# Patient Record
Sex: MALE | Race: WHITE | NOT HISPANIC OR LATINO | URBAN - METROPOLITAN AREA
[De-identification: names, ages, dates, MRNs, and addresses within clinical notes are randomized per-mention and may not be internally consistent; named-entity substitution may affect disease eponyms.]

---

## 2019-04-23 ENCOUNTER — APPOINTMENT (RX ONLY)
Dept: URBAN - METROPOLITAN AREA CLINIC 38 | Facility: CLINIC | Age: 69
Setting detail: DERMATOLOGY
End: 2019-04-23

## 2019-04-23 ENCOUNTER — APPOINTMENT (RX ONLY)
Dept: URBAN - METROPOLITAN AREA CLINIC 37 | Facility: CLINIC | Age: 69
Setting detail: DERMATOLOGY
End: 2019-04-23

## 2019-04-23 DIAGNOSIS — L27.0 GENERALIZED SKIN ERUPTION DUE TO DRUGS AND MEDICAMENTS TAKEN INTERNALLY: ICD-10-CM

## 2019-04-23 PROBLEM — Z85.828 PERSONAL HISTORY OF OTHER MALIGNANT NEOPLASM OF SKIN: Status: ACTIVE | Noted: 2019-04-23

## 2019-04-23 PROBLEM — I10 ESSENTIAL (PRIMARY) HYPERTENSION: Status: ACTIVE | Noted: 2019-04-23

## 2019-04-23 PROCEDURE — ? COUNSELING

## 2019-04-23 PROCEDURE — ? DEFER

## 2019-04-23 PROCEDURE — 99203 OFFICE O/P NEW LOW 30 MIN: CPT

## 2019-04-23 PROCEDURE — ? PRESCRIPTION

## 2019-04-23 PROCEDURE — ? TREATMENT REGIMEN

## 2019-04-23 RX ORDER — BETAMETHASONE DIPROPIONATE 0.5 MG/G
THIN LAYER CREAM TOPICAL BID
Qty: 1 | Refills: 0 | Status: ERX | COMMUNITY
Start: 2019-04-23

## 2019-04-23 RX ORDER — TRIAMCINOLONE ACETONIDE 1 MG/G
THIN LAYER CREAM TOPICAL TWICE DAILY
Qty: 1 | Refills: 0 | Status: ERX

## 2019-04-23 RX ORDER — TRIAMCINOLONE ACETONIDE 1 MG/G
THIN LAYER OINTMENT TOPICAL BID
Qty: 1 | Refills: 0 | Status: ERX | COMMUNITY
Start: 2019-04-23

## 2019-04-23 RX ADMIN — BETAMETHASONE DIPROPIONATE THIN LAYER: 0.5 CREAM TOPICAL at 14:10

## 2019-04-23 RX ADMIN — TRIAMCINOLONE ACETONIDE THIN LAYER: 1 OINTMENT TOPICAL at 14:35

## 2019-04-23 ASSESSMENT — LOCATION DETAILED DESCRIPTION DERM
LOCATION DETAILED: RIGHT SUPERIOR MEDIAL UPPER BACK
LOCATION DETAILED: RIGHT SUPERIOR MEDIAL UPPER BACK
LOCATION DETAILED: STERNUM
LOCATION DETAILED: STERNUM

## 2019-04-23 ASSESSMENT — LOCATION ZONE DERM
LOCATION ZONE: TRUNK
LOCATION ZONE: TRUNK

## 2019-04-23 ASSESSMENT — LOCATION SIMPLE DESCRIPTION DERM
LOCATION SIMPLE: RIGHT UPPER BACK
LOCATION SIMPLE: RIGHT UPPER BACK
LOCATION SIMPLE: CHEST
LOCATION SIMPLE: CHEST

## 2019-04-23 NOTE — HPI: RASH
What Type Of Note Output Would You Prefer (Optional)?: Bullet Format
How Severe Is Your Rash?: severe
Is This A New Presentation, Or A Follow-Up?: Rash
Additional History: Pt presents today for a rash, recently was in the hospital for CHF. Pt had a catch procedure done. Started new medications: Lasix and metoprolol. The rash is extremely itchy and red.\\n\\nWhen he had the cath with dye he states he doesn't believe the rash started immediately after but in the next day or so after but he also started Lasix and metoprolol at the same time. \\n\\nHe was previously on Lisinopril but had been on that for \"20 years\" and was never with any side effect. He did stop it a few days ago.  \\n\\nHe is being taken off the metoprolol in a few days and the lasix only PRN but will be starting a new medication on Thursday. \\n\\nPatients wife would prefer NOT to have a biopsy due to a history of \"MRSA\". \\n\\nThey saw Cardio yesterday (NP at Dr. Sinha's office). They state he is still having issues with \"fluid retention\" though the lasix is being given PRN.\\n\\nHe has no relief with Gold bond and only temporary relief with Benadryl. He is uncomfortable and cannot sleep

## 2019-04-23 NOTE — PROCEDURE: MIPS QUALITY
Quality 226: Preventive Care And Screening: Tobacco Use: Screening And Cessation Intervention: Patient screened for tobacco use and is an ex/non-smoker
Quality 431: Preventive Care And Screening: Unhealthy Alcohol Use - Screening: Patient screened for unhealthy alcohol use using a single question and scores less than 2 times per year
Quality 130: Documentation Of Current Medications In The Medical Record: Current Medications Documented
Detail Level: Generalized
Quality 110: Preventive Care And Screening: Influenza Immunization: Influenza Immunization not Administered for Documented Reasons.
Quality 111:Pneumonia Vaccination Status For Older Adults: Pneumococcal Vaccination not Administered or Previously Received, Reason not Otherwise Specified
Additional Notes: Pt has not received flu vaccination nor pneumonia vaccination for personal reasons. No ACD
Quality 47: Advance Care Plan: Advance Care Planning discussed and documented in the medical record; patient did not wish or was not able to name a surrogate decision maker or provide an advance care plan.

## 2019-04-23 NOTE — PROCEDURE: TREATMENT REGIMEN
Plan: D/w patient and his wife is likely a medication induced dermatitis (Lasix being the most likely culprit. Given he will be discontinuing the metoprolol and Lasix we should see steady but slow improvement over the next few weeks . \\n\\nExplained treatment would consist of high dose of antihistamines and PREDNISONE, but first have to d/w cardiologist first. I did phone to Dr. Horton in the office but no one was able to s/w me so I left a message asking to be called back asap.  Intent iwll be to stay off the PO prednisone but will have a plan (if approved to do so) should it be absolutley necessary. \\n\\nrecently d/c LISINOPRIL and LASIX is prn at this time.\\n\\nExplained to pt that with last testing of staph, was not MRSA but penicillin resistant staph.\\n\\nWill hold off on PREDNISONE at this time until d/w cardio and again - only use if absolutely necessary. \\n\\nRTC in 1 week unless clear.\\n\\nMed review is as follows: \\n\\nmetoprolol: exanthems 1%, pruritus 1-5%, rash <5%, urticaria\\nlasix (furosemide): exanthems 0.2-12%, pruritus <1%, rash <1%, urticaria, vasculitis\\n\\n**VIVEK**\\n\\n[Sent TCM CREAM by accident, called pharmacy to void and sent new rx for ointment instead.]
Initiate Treatment: BETAMETHASONE AUGMENTED bid for 1 week. Then apply, TCM ointment bid. Avoid face/folds
Otc Regimen: CLARITIN/ZYRTEC/ALLEGRA without decongestant BID. May increase to TID if itch persist
Detail Level: Zone

## 2019-04-23 NOTE — PROCEDURE: TREATMENT REGIMEN
Initiate Treatment: BETAMETHASONE AUGMENTED bid for 1 week. Then apply, TCM ointment bid. Avoid face/folds
Otc Regimen: CLARITIN/ZYRTEC/ALLEGRA without decongestant BID. May increase to TID if itch persist
Plan: D/w patient and his wife is likely a medication induced dermatitis (Lasix being the most likely culprit. Given he will be discontinuing the metoprolol and Lasix we should see steady but slow improvement over the next few weeks . \\n\\nExplained treatment would consist of high dose of antihistamines and PREDNISONE, but first have to d/w cardiologist first. I did phone to Dr. Sinha in the office but no one was able to s/w me so I left a message asking to be called back asap.  Intent iwll be to stay off the PO prednisone but will have a plan (if approved to do so) should it be absolutley necessary. \\n\\nrecently d/c LISINOPRIL and LASIX is prn at this time.\\n\\nExplained to pt that with last testing of staph, was not MRSA but penicillin resistant staph.\\n\\nWill hold off on PREDNISONE at this time until d/w cardio and again - only use if absolutely necessary. \\n\\nRTC in 1 week unless clear.\\n\\nMed review is as follows: \\n\\nmetoprolol: exanthems 1%, pruritus 1-5%, rash <5%, urticaria\\nlasix (furosemide): exanthems 0.2-12%, pruritus <1%, rash <1%, urticaria, vasculitis\\n\\n**HAILY**\\n\\n[Sent TCM CREAM by accident, called pharmacy to void and sent new rx for ointment instead.]
Detail Level: Zone

## 2019-04-23 NOTE — PROCEDURE: DEFER
Introduction Text (Please End With A Colon): The following procedure was deferred:
Instructions (Optional): Patient and wife defer a biopsy due to history of \"MRSA\" which I clarified with patient and wife was scant growth of staph (non-MRSA) on culture s/p left shoulder NMSC excision.  We discussed that with all his current medical issues in play we will hold on a biopsy however if rash does not improve or worsens we will pursue and treat with bactroban post procedure.
Procedure To Be Performed At Next Visit: Biopsy by punch method
Detail Level: Detailed

## 2019-04-23 NOTE — PROCEDURE: DEFER
Procedure To Be Performed At Next Visit: Biopsy by punch method
Introduction Text (Please End With A Colon): The following procedure was deferred:
Detail Level: Detailed
Instructions (Optional): Patient and wife defer a biopsy due to history of \"MRSA\" which I clarified with patient and wife was scant growth of staph (non-MRSA) on culture s/p left shoulder NMSC excision.  We discussed that with all his current medical issues in play we will hold on a biopsy however if rash does not improve or worsens we will pursue and treat with bactroban post procedure.

## 2019-04-23 NOTE — PROCEDURE: MIPS QUALITY
Quality 226: Preventive Care And Screening: Tobacco Use: Screening And Cessation Intervention: Patient screened for tobacco use and is an ex/non-smoker
Quality 111:Pneumonia Vaccination Status For Older Adults: Pneumococcal Vaccination not Administered or Previously Received, Reason not Otherwise Specified
Detail Level: Generalized
Quality 47: Advance Care Plan: Advance Care Planning discussed and documented in the medical record; patient did not wish or was not able to name a surrogate decision maker or provide an advance care plan.
Quality 130: Documentation Of Current Medications In The Medical Record: Current Medications Documented
Quality 431: Preventive Care And Screening: Unhealthy Alcohol Use - Screening: Patient screened for unhealthy alcohol use using a single question and scores less than 2 times per year
Quality 110: Preventive Care And Screening: Influenza Immunization: Influenza Immunization not Administered for Documented Reasons.
Additional Notes: Pt has not received flu vaccination nor pneumonia vaccination for personal reasons. No ACD

## 2019-04-23 NOTE — HPI: RASH
What Type Of Note Output Would You Prefer (Optional)?: Bullet Format
How Severe Is Your Rash?: severe
Is This A New Presentation, Or A Follow-Up?: Rash
Additional History: Pt presents today for a rash, recently was in the hospital for CHF. Pt had a catch procedure done. Started new medications: Lasix and metoprolol. The rash is extremely itchy and red.\\n\\nWhen he had the cath with dye he states he doesn't believe the rash started immediately after but in the next day or so after but he also started Lasix and metoprolol at the same time. \\n\\nHe was previously on Lisinopril but had been on that for \"20 years\" and was never with any side effect. He did stop it a few days ago.  \\n\\nHe is being taken off the metoprolol in a few days and the lasix only PRN but will be starting a new medication on Thursday. \\n\\nPatients wife would prefer NOT to have a biopsy due to a history of \"MRSA\". \\n\\nThey saw Cardio yesterday (NP at Dr. Horton's office). They state he is still having issues with \"fluid retention\" though the lasix is being given PRN.\\n\\nHe has no relief with Gold bond and only temporary relief with Benadryl. He is uncomfortable and cannot sleep

## 2024-05-01 ENCOUNTER — APPOINTMENT (RX ONLY)
Dept: URBAN - METROPOLITAN AREA CLINIC 164 | Facility: CLINIC | Age: 74
Setting detail: DERMATOLOGY
End: 2024-05-01

## 2024-05-01 DIAGNOSIS — L81.4 OTHER MELANIN HYPERPIGMENTATION: ICD-10-CM

## 2024-05-01 DIAGNOSIS — L82.0 INFLAMED SEBORRHEIC KERATOSIS: ICD-10-CM

## 2024-05-01 DIAGNOSIS — L57.0 ACTINIC KERATOSIS: ICD-10-CM

## 2024-05-01 DIAGNOSIS — L57.8 OTHER SKIN CHANGES DUE TO CHRONIC EXPOSURE TO NONIONIZING RADIATION: ICD-10-CM

## 2024-05-01 PROCEDURE — ? COUNSELING

## 2024-05-01 PROCEDURE — ? LIQUID NITROGEN

## 2024-05-01 PROCEDURE — 17000 DESTRUCT PREMALG LESION: CPT | Mod: 59

## 2024-05-01 PROCEDURE — 99213 OFFICE O/P EST LOW 20 MIN: CPT | Mod: 25

## 2024-05-01 PROCEDURE — 17110 DESTRUCTION B9 LES UP TO 14: CPT

## 2024-05-01 ASSESSMENT — LOCATION SIMPLE DESCRIPTION DERM
LOCATION SIMPLE: LEFT CHEEK
LOCATION SIMPLE: RIGHT NOSE
LOCATION SIMPLE: LOWER BACK
LOCATION SIMPLE: RIGHT CHEEK

## 2024-05-01 ASSESSMENT — LOCATION DETAILED DESCRIPTION DERM
LOCATION DETAILED: RIGHT INFERIOR CENTRAL MALAR CHEEK
LOCATION DETAILED: SUPERIOR LUMBAR SPINE
LOCATION DETAILED: LEFT CENTRAL MALAR CHEEK
LOCATION DETAILED: RIGHT NASAL SIDEWALL

## 2024-05-01 ASSESSMENT — LOCATION ZONE DERM
LOCATION ZONE: TRUNK
LOCATION ZONE: NOSE
LOCATION ZONE: FACE

## 2024-05-01 NOTE — PROCEDURE: LIQUID NITROGEN
Consent: The patient's consent was obtained including but not limited to risks of crusting, scabbing, blistering, scarring, darker or lighter pigmentary change, recurrence, incomplete removal and infection.
Show Aperture Variable?: Yes
Number Of Freeze-Thaw Cycles: 1 freeze-thaw cycle
Render Post-Care Instructions In Note?: no
Detail Level: Detailed
Duration Of Freeze Thaw-Cycle (Seconds): 5
Post-Care Instructions: I reviewed with the patient in detail post-care instructions. Patient is to wear sunprotection, and avoid picking at any of the treated lesions. Pt may apply Vaseline to crusted or scabbing areas.
Medical Necessity Information: It is in your best interest to select a reason for this procedure from the list below. All of these items fulfill various CMS LCD requirements except the new and changing color options.
Spray Paint Text: The liquid nitrogen was applied to the skin utilizing a spray paint frosting technique.
Duration Of Freeze Thaw-Cycle (Seconds): 5-10
Medical Necessity Clause: This procedure was medically necessary because the lesions that were treated were:

## 2025-04-02 ENCOUNTER — APPOINTMENT (OUTPATIENT)
Dept: URBAN - METROPOLITAN AREA CLINIC 164 | Facility: CLINIC | Age: 75
Setting detail: DERMATOLOGY
End: 2025-04-02

## 2025-04-02 DIAGNOSIS — L81.4 OTHER MELANIN HYPERPIGMENTATION: ICD-10-CM

## 2025-04-02 DIAGNOSIS — D18.0 HEMANGIOMA: ICD-10-CM

## 2025-04-02 DIAGNOSIS — D22 MELANOCYTIC NEVI: ICD-10-CM

## 2025-04-02 DIAGNOSIS — L57.8 OTHER SKIN CHANGES DUE TO CHRONIC EXPOSURE TO NONIONIZING RADIATION: ICD-10-CM

## 2025-04-02 DIAGNOSIS — L82.1 OTHER SEBORRHEIC KERATOSIS: ICD-10-CM

## 2025-04-02 DIAGNOSIS — Z71.89 OTHER SPECIFIED COUNSELING: ICD-10-CM

## 2025-04-02 PROBLEM — D22.61 MELANOCYTIC NEVI OF RIGHT UPPER LIMB, INCLUDING SHOULDER: Status: ACTIVE | Noted: 2025-04-02

## 2025-04-02 PROBLEM — D22.62 MELANOCYTIC NEVI OF LEFT UPPER LIMB, INCLUDING SHOULDER: Status: ACTIVE | Noted: 2025-04-02

## 2025-04-02 PROBLEM — D22.39 MELANOCYTIC NEVI OF OTHER PARTS OF FACE: Status: ACTIVE | Noted: 2025-04-02

## 2025-04-02 PROBLEM — D22.5 MELANOCYTIC NEVI OF TRUNK: Status: ACTIVE | Noted: 2025-04-02

## 2025-04-02 PROBLEM — D18.01 HEMANGIOMA OF SKIN AND SUBCUTANEOUS TISSUE: Status: ACTIVE | Noted: 2025-04-02

## 2025-04-02 PROCEDURE — ? COUNSELING

## 2025-04-02 PROCEDURE — 99213 OFFICE O/P EST LOW 20 MIN: CPT

## 2025-04-02 ASSESSMENT — LOCATION SIMPLE DESCRIPTION DERM
LOCATION SIMPLE: LEFT FOREARM
LOCATION SIMPLE: LEFT ELBOW
LOCATION SIMPLE: INFERIOR FOREHEAD
LOCATION SIMPLE: LEFT UPPER ARM
LOCATION SIMPLE: LEFT CHEEK
LOCATION SIMPLE: LEFT LOWER BACK
LOCATION SIMPLE: RIGHT POSTERIOR UPPER ARM
LOCATION SIMPLE: LEFT UPPER BACK
LOCATION SIMPLE: RIGHT UPPER ARM
LOCATION SIMPLE: RIGHT FOREARM
LOCATION SIMPLE: RIGHT UPPER BACK
LOCATION SIMPLE: RIGHT FOREHEAD

## 2025-04-02 ASSESSMENT — LOCATION DETAILED DESCRIPTION DERM
LOCATION DETAILED: LEFT ANTERIOR DISTAL UPPER ARM
LOCATION DETAILED: LEFT INFERIOR LATERAL UPPER BACK
LOCATION DETAILED: RIGHT PROXIMAL DORSAL FOREARM
LOCATION DETAILED: LEFT PROXIMAL DORSAL FOREARM
LOCATION DETAILED: LEFT CENTRAL MALAR CHEEK
LOCATION DETAILED: RIGHT SUPERIOR UPPER BACK
LOCATION DETAILED: LEFT LATERAL UPPER BACK
LOCATION DETAILED: INFERIOR MID FOREHEAD
LOCATION DETAILED: RIGHT ANTERIOR DISTAL UPPER ARM
LOCATION DETAILED: RIGHT DISTAL POSTERIOR UPPER ARM
LOCATION DETAILED: LEFT ELBOW
LOCATION DETAILED: LEFT SUPERIOR LATERAL MIDBACK
LOCATION DETAILED: RIGHT SUPERIOR MEDIAL FOREHEAD

## 2025-04-02 ASSESSMENT — LOCATION ZONE DERM
LOCATION ZONE: TRUNK
LOCATION ZONE: FACE
LOCATION ZONE: ARM